# Patient Record
Sex: MALE | Race: WHITE | NOT HISPANIC OR LATINO | URBAN - METROPOLITAN AREA
[De-identification: names, ages, dates, MRNs, and addresses within clinical notes are randomized per-mention and may not be internally consistent; named-entity substitution may affect disease eponyms.]

---

## 2022-04-30 ENCOUNTER — TELEPHONE ENCOUNTER (OUTPATIENT)
Dept: URBAN - METROPOLITAN AREA CLINIC 121 | Facility: CLINIC | Age: 22
End: 2022-04-30

## 2022-04-30 RX ORDER — DIAZEPAM 5 MG/1
TABLET ORAL
OUTPATIENT
Start: 2019-12-27

## 2022-04-30 RX ORDER — LORATADINE 5 MG
TABLET,CHEWABLE ORAL
OUTPATIENT
Start: 2019-12-27

## 2022-04-30 RX ORDER — LEVOCARNITINE 1 G/10ML
SOLUTION ORAL
OUTPATIENT
Start: 2019-12-27

## 2022-04-30 RX ORDER — CETIRIZINE HYDROCHLORIDE 10 MG/1
TABLET, FILM COATED ORAL
OUTPATIENT
Start: 2019-12-27

## 2022-04-30 RX ORDER — SACCHAROMYCES BOULARDII 250 MG
CAPSULE ORAL
OUTPATIENT
Start: 2019-12-27

## 2022-04-30 RX ORDER — UBIDECARENONE/VIT E ACET 100MG-5
CAPSULE ORAL
OUTPATIENT
Start: 2019-12-27

## 2022-04-30 RX ORDER — NUTRITIONAL SUPPLEMENT 0.04G-1/ML
LIQUID (ML) ORAL
OUTPATIENT
Start: 2019-12-27

## 2022-04-30 RX ORDER — LANSOPRAZOLE 30 MG/1
1 TABLET BY MOUTH EVERY MORNING TABLET, ORALLY DISINTEGRATING, DELAYED RELEASE ORAL
OUTPATIENT
Start: 2020-05-27

## 2022-04-30 RX ORDER — BACILLUS COAGULANS/LACTASE 500MM-3000
CAPSULE ORAL
OUTPATIENT
Start: 2019-12-27

## 2022-04-30 RX ORDER — GABAPENTIN 250 MG/5ML
SOLUTION ORAL
OUTPATIENT
Start: 2019-12-27

## 2022-04-30 RX ORDER — BACLOFEN 10 MG/20ML
INJECTION INTRATHECAL
OUTPATIENT
Start: 2019-12-27

## 2022-05-01 ENCOUNTER — TELEPHONE ENCOUNTER (OUTPATIENT)
Dept: URBAN - METROPOLITAN AREA CLINIC 121 | Facility: CLINIC | Age: 22
End: 2022-05-01

## 2022-05-01 RX ORDER — DIAZEPAM 5 MG/1
TABLET ORAL
Status: ACTIVE | COMMUNITY
Start: 2019-12-27

## 2022-05-01 RX ORDER — GABAPENTIN 250 MG/5ML
SOLUTION ORAL
Status: ACTIVE | COMMUNITY
Start: 2019-12-27

## 2022-05-01 RX ORDER — LEVOCARNITINE 1 G/10ML
SOLUTION ORAL
Status: ACTIVE | COMMUNITY
Start: 2019-12-27

## 2022-05-01 RX ORDER — LANSOPRAZOLE 30 MG/1
TAKE 1 TABLET BY MOUTH EVERY MORNING TABLET, ORALLY DISINTEGRATING, DELAYED RELEASE ORAL
Status: ACTIVE | COMMUNITY
Start: 2020-05-27 | End: 2022-09-04

## 2022-05-01 RX ORDER — CETIRIZINE HYDROCHLORIDE 10 MG/1
TABLET, FILM COATED ORAL
Status: ACTIVE | COMMUNITY
Start: 2019-12-27

## 2022-05-01 RX ORDER — UBIDECARENONE 100 MG
CAPSULE ORAL
Status: ACTIVE | COMMUNITY
Start: 2019-12-27

## 2022-05-01 RX ORDER — BACILLUS COAGULANS/LACTASE 500MM-3000
CAPSULE ORAL
Status: ACTIVE | COMMUNITY
Start: 2019-12-27

## 2022-05-01 RX ORDER — LORATADINE 5 MG
TABLET,CHEWABLE ORAL
Status: ACTIVE | COMMUNITY
Start: 2019-12-27

## 2022-05-01 RX ORDER — BACLOFEN 10 MG/20ML
INJECTION INTRATHECAL
Status: ACTIVE | COMMUNITY
Start: 2019-12-27

## 2022-05-01 RX ORDER — NUTRITIONAL SUPPLEMENT 0.04G-1/ML
LIQUID (ML) ORAL
Status: ACTIVE | COMMUNITY
Start: 2019-12-27

## 2022-08-05 ENCOUNTER — ERX REFILL RESPONSE (OUTPATIENT)
Dept: URBAN - METROPOLITAN AREA CLINIC 27 | Facility: CLINIC | Age: 22
End: 2022-08-05

## 2022-08-05 RX ORDER — LANSOPRAZOLE 30 MG/1
TAKE ONE TABLET EVERY MORNING K21.9 PA GOOD THRU 08/27/22SD PA 90182720 @@@@ /PA99995 TABLET, ORALLY DISINTEGRATING, DELAYED RELEASE ORAL
Qty: 90 TABLET | Refills: 0 | OUTPATIENT

## 2022-09-30 ENCOUNTER — TELEPHONE ENCOUNTER (OUTPATIENT)
Dept: URBAN - METROPOLITAN AREA CLINIC 29 | Facility: CLINIC | Age: 22
End: 2022-09-30

## 2022-11-28 ENCOUNTER — ERX REFILL RESPONSE (OUTPATIENT)
Dept: URBAN - METROPOLITAN AREA CLINIC 27 | Facility: CLINIC | Age: 22
End: 2022-11-28

## 2022-11-28 RX ORDER — LANSOPRAZOLE 30 MG/1
TAKE ONE TABLET EVERY MORNING K21.9 PA GOOD THRU 08/27/22SD PA 90182720 @@@@ /PA99995 TABLET, ORALLY DISINTEGRATING, DELAYED RELEASE ORAL
Qty: 90 TABLET | Refills: 0 | OUTPATIENT

## 2022-11-28 RX ORDER — LANSOPRAZOLE 30 MG/1
TABLET, ORALLY DISINTEGRATING, DELAYED RELEASE ORAL
Qty: 90 TABLET | Refills: 0 | OUTPATIENT

## 2024-08-09 ENCOUNTER — DASHBOARD ENCOUNTERS (OUTPATIENT)
Age: 24
End: 2024-08-09

## 2024-08-09 ENCOUNTER — OFFICE VISIT (OUTPATIENT)
Dept: URBAN - METROPOLITAN AREA CLINIC 96 | Facility: CLINIC | Age: 24
End: 2024-08-09
Payer: COMMERCIAL

## 2024-08-09 VITALS
DIASTOLIC BLOOD PRESSURE: 95 MMHG | HEART RATE: 116 BPM | SYSTOLIC BLOOD PRESSURE: 111 MMHG | HEIGHT: 62 IN | TEMPERATURE: 97.7 F | WEIGHT: 87.2 LBS | BODY MASS INDEX: 16.05 KG/M2

## 2024-08-09 DIAGNOSIS — K21.9 GASTROESOPHAGEAL REFLUX DISEASE, UNSPECIFIED WHETHER ESOPHAGITIS PRESENT: ICD-10-CM

## 2024-08-09 DIAGNOSIS — Z93.1 FEEDING BY G-TUBE: ICD-10-CM

## 2024-08-09 DIAGNOSIS — G31.82 LEIGH SYNDROME: ICD-10-CM

## 2024-08-09 PROBLEM — 235595009: Status: ACTIVE | Noted: 2024-08-09

## 2024-08-09 PROBLEM — 302109006: Status: ACTIVE | Noted: 2024-08-09

## 2024-08-09 PROCEDURE — 99204 OFFICE O/P NEW MOD 45 MIN: CPT | Performed by: INTERNAL MEDICINE

## 2024-08-09 PROCEDURE — 99214 OFFICE O/P EST MOD 30 MIN: CPT | Performed by: INTERNAL MEDICINE

## 2024-08-09 RX ORDER — LEVOCARNITINE 1 G/10ML
SOLUTION ORAL
Status: ACTIVE | COMMUNITY
Start: 2019-12-27

## 2024-08-09 RX ORDER — NUTRITIONAL SUPPLEMENT 0.04G-1/ML
LIQUID (ML) ORAL
Status: ACTIVE | COMMUNITY
Start: 2019-12-27

## 2024-08-09 RX ORDER — LANSOPRAZOLE 30 MG/1
1 TABLET TABLET, ORALLY DISINTEGRATING, DELAYED RELEASE ORAL ONCE A DAY
Qty: 90 TABLET | Refills: 3 | OUTPATIENT
Start: 2024-08-09

## 2024-08-09 RX ORDER — UBIDECARENONE 100 MG
CAPSULE ORAL
Status: ACTIVE | COMMUNITY
Start: 2019-12-27

## 2024-08-09 RX ORDER — BACILLUS COAGULANS/LACTASE 500MM-3000
CAPSULE ORAL
Status: ACTIVE | COMMUNITY
Start: 2019-12-27

## 2024-08-09 RX ORDER — LORATADINE 5 MG
TABLET,CHEWABLE ORAL
Status: ACTIVE | COMMUNITY
Start: 2019-12-27

## 2024-08-09 RX ORDER — GABAPENTIN 250 MG/5ML
SOLUTION ORAL
Status: ACTIVE | COMMUNITY
Start: 2019-12-27

## 2024-08-09 RX ORDER — BACLOFEN 10 MG/20ML
INJECTION INTRATHECAL
Status: ACTIVE | COMMUNITY
Start: 2019-12-27

## 2024-08-09 RX ORDER — DIAZEPAM 5 MG/1
TABLET ORAL
Status: ACTIVE | COMMUNITY
Start: 2019-12-27

## 2024-08-09 RX ORDER — CETIRIZINE HYDROCHLORIDE 10 MG/1
TABLET, FILM COATED ORAL
Status: ACTIVE | COMMUNITY
Start: 2019-12-27

## 2024-08-09 RX ORDER — LANSOPRAZOLE 30 MG/1
TABLET, ORALLY DISINTEGRATING, DELAYED RELEASE ORAL
Qty: 90 TABLET | Refills: 0 | Status: ACTIVE | COMMUNITY

## 2024-08-09 NOTE — HPI-TODAY'S VISIT:
This is a 24-year-old male referred by Dr Hannah Harkins for GI consultation and a copy will be sent to the referring provider.  Patient did see Dr. Javier Underwood in our group in 2019 for reflux.  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  He was on MiraLAX for constipation issues and Prevacid for reflux. Pts cousin- Miesha and caregiver- Fina. Pt was just d/c from [facility]- had covid. DId not need intubation. Was admitted for abx and IVF for 3 days. No gi issues. Pt was told to fu with all of his doctors. They could not see Dr Underwood so seeing me today. Pt gets ntren (nestle, 1 g of fiber unflavored solution) 4x a day. Pts weight stable and peg working well. Peg was placed in [place] in 2006- 8 yrs later- peg replaced. Ureteral opening on abdomen- they cath 4x a day.

## 2024-08-09 NOTE — PHYSICAL EXAM CHEST:
no lesions, no deformities, no traumatic injuries, no significant scars are present, chest wall non-tender, no masses present, breathing is unlabored without accessory muscle use,normal breath sounds
Detail Level: Detailed
Size Of Lesion: 7x5mm
Morphology Per Location (Optional): Dark brown macule, with a network visualized throughout
Size Of Lesion: 4mm
Morphology Per Location (Optional): Black blue homogeneous papule

## 2024-10-18 ENCOUNTER — WEB ENCOUNTER (OUTPATIENT)
Dept: URBAN - METROPOLITAN AREA CLINIC 98 | Facility: CLINIC | Age: 24
End: 2024-10-18

## 2024-10-22 ENCOUNTER — P2P PATIENT RECORD (OUTPATIENT)
Age: 24
End: 2024-10-22

## 2025-01-22 ENCOUNTER — WEB ENCOUNTER (OUTPATIENT)
Dept: URBAN - METROPOLITAN AREA CLINIC 96 | Facility: CLINIC | Age: 25
End: 2025-01-22

## 2025-03-19 ENCOUNTER — OFFICE VISIT (OUTPATIENT)
Dept: URBAN - METROPOLITAN AREA CLINIC 96 | Facility: CLINIC | Age: 25
End: 2025-03-19
Payer: COMMERCIAL

## 2025-03-19 VITALS
HEART RATE: 111 BPM | DIASTOLIC BLOOD PRESSURE: 72 MMHG | BODY MASS INDEX: 16.75 KG/M2 | SYSTOLIC BLOOD PRESSURE: 119 MMHG | TEMPERATURE: 97.7 F | HEIGHT: 62 IN | WEIGHT: 91 LBS

## 2025-03-19 DIAGNOSIS — Z93.1 FEEDING BY G-TUBE: ICD-10-CM

## 2025-03-19 DIAGNOSIS — K21.9 GASTROESOPHAGEAL REFLUX DISEASE, UNSPECIFIED WHETHER ESOPHAGITIS PRESENT: ICD-10-CM

## 2025-03-19 DIAGNOSIS — G31.82 LEIGH SYNDROME: ICD-10-CM

## 2025-03-19 PROCEDURE — 99214 OFFICE O/P EST MOD 30 MIN: CPT | Performed by: INTERNAL MEDICINE

## 2025-03-19 PROCEDURE — 99204 OFFICE O/P NEW MOD 45 MIN: CPT | Performed by: INTERNAL MEDICINE

## 2025-03-19 RX ORDER — LORATADINE 5 MG
TABLET,CHEWABLE ORAL
Status: ACTIVE | COMMUNITY
Start: 2019-12-27

## 2025-03-19 RX ORDER — LEVOCARNITINE 1 G/10ML
SOLUTION ORAL
Status: ACTIVE | COMMUNITY
Start: 2019-12-27

## 2025-03-19 RX ORDER — UBIDECARENONE 100 MG
CAPSULE ORAL
Status: ACTIVE | COMMUNITY
Start: 2019-12-27

## 2025-03-19 RX ORDER — BACILLUS COAGULANS/LACTASE 500MM-3000
CAPSULE ORAL
Status: ACTIVE | COMMUNITY
Start: 2019-12-27

## 2025-03-19 RX ORDER — GABAPENTIN 250 MG/5ML
SOLUTION ORAL
Status: ACTIVE | COMMUNITY
Start: 2019-12-27

## 2025-03-19 RX ORDER — LANSOPRAZOLE 30 MG/1
TABLET, ORALLY DISINTEGRATING, DELAYED RELEASE ORAL
Qty: 90 TABLET | Refills: 0 | Status: ACTIVE | COMMUNITY

## 2025-03-19 RX ORDER — LANSOPRAZOLE 30 MG/1
1 TABLET TABLET, ORALLY DISINTEGRATING, DELAYED RELEASE ORAL ONCE A DAY
Qty: 90 TABLET | Refills: 3 | Status: ACTIVE | COMMUNITY
Start: 2024-08-09

## 2025-03-19 RX ORDER — CETIRIZINE HYDROCHLORIDE 10 MG/1
TABLET, FILM COATED ORAL
Status: ACTIVE | COMMUNITY
Start: 2019-12-27

## 2025-03-19 RX ORDER — DIAZEPAM 5 MG/1
TABLET ORAL
Status: ACTIVE | COMMUNITY
Start: 2019-12-27

## 2025-03-19 RX ORDER — LANSOPRAZOLE 30 MG/1
1 TABLET TABLET, ORALLY DISINTEGRATING, DELAYED RELEASE ORAL ONCE A DAY
Qty: 90 TABLET | Refills: 3 | OUTPATIENT

## 2025-03-19 RX ORDER — NUTRITIONAL SUPPLEMENT 0.04G-1/ML
LIQUID (ML) ORAL
Status: ACTIVE | COMMUNITY
Start: 2019-12-27

## 2025-03-19 RX ORDER — BACLOFEN 10 MG/20ML
INJECTION INTRATHECAL
Status: ACTIVE | COMMUNITY
Start: 2019-12-27

## 2025-03-24 ENCOUNTER — WEB ENCOUNTER (OUTPATIENT)
Dept: URBAN - METROPOLITAN AREA CLINIC 98 | Facility: CLINIC | Age: 25
End: 2025-03-24

## 2025-03-25 ENCOUNTER — WEB ENCOUNTER (OUTPATIENT)
Dept: URBAN - METROPOLITAN AREA CLINIC 98 | Facility: CLINIC | Age: 25
End: 2025-03-25

## 2025-07-03 ENCOUNTER — ERX REFILL RESPONSE (OUTPATIENT)
Dept: URBAN - METROPOLITAN AREA CLINIC 96 | Facility: CLINIC | Age: 25
End: 2025-07-03

## 2025-07-03 RX ORDER — LANSOPRAZOLE 30 MG/1
ONE TABLET ORALLY ONCE A DAY 90 DAYS TABLET, ORALLY DISINTEGRATING, DELAYED RELEASE ORAL
Qty: 30 TABLET | Refills: 4

## 2025-07-07 ENCOUNTER — TELEPHONE ENCOUNTER (OUTPATIENT)
Dept: URBAN - METROPOLITAN AREA SURGERY CENTER 18 | Facility: SURGERY CENTER | Age: 25
End: 2025-07-07

## 2025-07-08 ENCOUNTER — TELEPHONE ENCOUNTER (OUTPATIENT)
Dept: URBAN - METROPOLITAN AREA SURGERY CENTER 18 | Facility: SURGERY CENTER | Age: 25
End: 2025-07-08